# Patient Record
Sex: FEMALE | Race: WHITE | NOT HISPANIC OR LATINO | URBAN - METROPOLITAN AREA
[De-identification: names, ages, dates, MRNs, and addresses within clinical notes are randomized per-mention and may not be internally consistent; named-entity substitution may affect disease eponyms.]

---

## 2023-07-26 ENCOUNTER — OFFICE VISIT (OUTPATIENT)
Dept: PODIATRY | Facility: CLINIC | Age: 73
End: 2023-07-26
Payer: MEDICARE

## 2023-07-26 VITALS — BODY MASS INDEX: 20.83 KG/M2 | WEIGHT: 125 LBS | RESPIRATION RATE: 16 BRPM | HEIGHT: 65 IN

## 2023-07-26 DIAGNOSIS — M77.51 BURSITIS OF RIGHT FOOT: ICD-10-CM

## 2023-07-26 DIAGNOSIS — M25.571 ARTHRALGIA OF RIGHT FOOT: ICD-10-CM

## 2023-07-26 DIAGNOSIS — M20.11 HALLUX VALGUS OF RIGHT FOOT: Primary | ICD-10-CM

## 2023-07-26 DIAGNOSIS — M79.671 RIGHT FOOT PAIN: ICD-10-CM

## 2023-07-26 DIAGNOSIS — M21.961 ACQUIRED DEFORMITY OF RIGHT FOOT: ICD-10-CM

## 2023-07-26 PROCEDURE — 99203 OFFICE O/P NEW LOW 30 MIN: CPT | Performed by: PODIATRIST

## 2023-07-26 PROCEDURE — 73620 X-RAY EXAM OF FOOT: CPT | Performed by: PODIATRIST

## 2023-07-26 NOTE — PROGRESS NOTES
Assessment/Plan: Hallux valgus deformity right greater than left with secondary calcific bursa right first MPJ. Arthralgia. Acquired deformity foot. Pain. Plan. Chart reviewed. X-rays taken reviewed. Patient advised on condition. Patient given treatment options. At this time her intent is to undergo surgery in order to remove the bursa and help with the bunion deformity. She will consider [facility]s. The patient is aware of aftercare instruction and needs. She is amenable to surgery. Diagnoses and all orders for this visit:    Hallux valgus of right foot    Acquired deformity of right foot    Arthralgia of right foot    Right foot pain    Bursitis of right foot          Subjective: Patient is seen on referral from another primary podiatric physician. She presents with painful bunion of her right foot. She knows she has a calcific bursa. She is interested in surgical intervention to help alleviate her complaint of pain. She gets pain when she wear shoes. No history of trauma    No Known Allergies    No current outpatient medications on file. There is no problem list on file for this patient. Patient ID: Sesar Le is a 71 y.o. female. HPI    The following portions of the patient's history were reviewed and updated as appropriate:     family history is not on file. has no history on file for tobacco use, alcohol use, and drug use. Vitals:    07/26/23 1000   Resp: 16       Review of Systems      Objective:  Patient's shoes and socks removed.    Foot Exam    General  General Appearance: appears stated age and healthy   Orientation: alert and oriented to person, place, and time   Affect: appropriate   Gait: antalgic       Right Foot/Ankle     Inspection and Palpation  Tenderness: bony tenderness and great toe metatarsophalangeal joint   Swelling: great toe metatarsophalangeal joint   Arch: pes planus  Hallux valgus: yes  Skin Exam: callus; Neurovascular  Dorsalis pedis: 2+  Posterior tibial: 2+  Saphenous nerve sensation: normal  Tibial nerve sensation: normal  Superficial peroneal nerve sensation: normal  Deep peroneal nerve sensation: normal  Sural nerve sensation: normal      Left Foot/Ankle      Inspection and Palpation  Arch: pes planus  Hallux valgus: yes  Skin Exam: callus; Neurovascular  Dorsalis pedis: 2+  Posterior tibial: 2+  Saphenous nerve sensation: normal  Tibial nerve sensation: normal  Superficial peroneal nerve sensation: normal  Deep peroneal nerve sensation: normal  Sural nerve sensation: normal        Physical Exam  Vitals and nursing note reviewed. Constitutional:       Appearance: Normal appearance. Cardiovascular:      Rate and Rhythm: Normal rate and regular rhythm. Pulses:           Dorsalis pedis pulses are 2+ on the right side and 2+ on the left side. Posterior tibial pulses are 2+ on the right side and 2+ on the left side. Musculoskeletal:      Right foot: Bunion and bony tenderness present. Left foot: Bunion present. Feet:      Right foot:      Skin integrity: Callus present. Left foot:      Skin integrity: Callus present. Comments: Patient is pronated in stance and gait. She has a large hallux valgus deformity bilateral.  There are track bound. Right greater than left. 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  Right first MPJ has calcific bursa. May need to rule out tophaceous gout. Skin:     Capillary Refill: Capillary refill takes less than 2 seconds. Neurological:      Mental Status: She is alert. Psychiatric:         Mood and Affect: Mood normal.         Behavior: Behavior normal.         Thought Content:  Thought content normal.         Judgment: Judgment normal.